# Patient Record
Sex: MALE | Race: ASIAN
[De-identification: names, ages, dates, MRNs, and addresses within clinical notes are randomized per-mention and may not be internally consistent; named-entity substitution may affect disease eponyms.]

---

## 2017-10-10 ENCOUNTER — APPOINTMENT (OUTPATIENT)
Dept: UROLOGY | Facility: CLINIC | Age: 52
End: 2017-10-10
Payer: MEDICAID

## 2017-10-10 VITALS
SYSTOLIC BLOOD PRESSURE: 138 MMHG | RESPIRATION RATE: 18 BRPM | WEIGHT: 152 LBS | DIASTOLIC BLOOD PRESSURE: 87 MMHG | TEMPERATURE: 97.6 F | BODY MASS INDEX: 22.01 KG/M2 | HEIGHT: 69.5 IN | HEART RATE: 81 BPM | OXYGEN SATURATION: 100 %

## 2017-10-10 DIAGNOSIS — Z80.0 FAMILY HISTORY OF MALIGNANT NEOPLASM OF DIGESTIVE ORGANS: ICD-10-CM

## 2017-10-10 LAB
ANION GAP SERPL CALC-SCNC: 14 MMOL/L
APPEARANCE: CLEAR
BACTERIA: NEGATIVE
BILIRUBIN URINE: NEGATIVE
BLOOD URINE: NEGATIVE
BUN SERPL-MCNC: 12 MG/DL
CALCIUM OXALATE CRYSTALS: ABNORMAL
CALCIUM SERPL-MCNC: 9.7 MG/DL
CHLORIDE SERPL-SCNC: 103 MMOL/L
CO2 SERPL-SCNC: 24 MMOL/L
COLOR: YELLOW
CREAT SERPL-MCNC: 0.82 MG/DL
GLUCOSE QUALITATIVE U: NEGATIVE MG/DL
GLUCOSE SERPL-MCNC: 90 MG/DL
KETONES URINE: NEGATIVE
LEUKOCYTE ESTERASE URINE: NEGATIVE
MICROSCOPIC-UA: NORMAL
NITRITE URINE: NEGATIVE
PH URINE: 5
POTASSIUM SERPL-SCNC: 4.3 MMOL/L
PROTEIN URINE: NEGATIVE MG/DL
PSA FREE FLD-MCNC: 23.6
PSA FREE SERPL-MCNC: 0.17 NG/ML
PSA SERPL-MCNC: 0.72 NG/ML
RED BLOOD CELLS URINE: 1 /HPF
SODIUM SERPL-SCNC: 141 MMOL/L
SPECIFIC GRAVITY URINE: 1.02
SQUAMOUS EPITHELIAL CELLS: 0 /HPF
UROBILINOGEN URINE: NEGATIVE MG/DL
WHITE BLOOD CELLS URINE: 1 /HPF

## 2017-10-10 PROCEDURE — 99204 OFFICE O/P NEW MOD 45 MIN: CPT

## 2017-10-10 RX ORDER — MELOXICAM 7.5 MG/1
7.5 TABLET ORAL
Qty: 30 | Refills: 0 | Status: ACTIVE | COMMUNITY
Start: 2017-07-22

## 2017-10-10 RX ORDER — LEVOFLOXACIN 500 MG/1
500 TABLET, FILM COATED ORAL
Qty: 14 | Refills: 0 | Status: ACTIVE | COMMUNITY
Start: 2017-08-26

## 2017-10-10 RX ORDER — AMOXICILLIN AND CLAVULANATE POTASSIUM 875; 125 MG/1; MG/1
875-125 TABLET, COATED ORAL
Qty: 28 | Refills: 0 | Status: ACTIVE | COMMUNITY
Start: 2017-09-16

## 2017-10-10 RX ORDER — LOSARTAN POTASSIUM 50 MG/1
50 TABLET, FILM COATED ORAL
Qty: 30 | Refills: 0 | Status: ACTIVE | COMMUNITY
Start: 2017-09-02

## 2017-10-10 RX ORDER — POLYETHYLENE GLYCOL-3350, SODIUM CHLORIDE, POTASSIUM CHLORIDE AND SODIUM BICARBONATE 420; 11.2; 5.72; 1.48 G/438.4G; G/438.4G; G/438.4G; G/438.4G
420 POWDER, FOR SOLUTION ORAL
Qty: 4000 | Refills: 0 | Status: ACTIVE | COMMUNITY
Start: 2017-09-16

## 2017-10-10 RX ORDER — AMLODIPINE BESYLATE 5 MG/1
5 TABLET ORAL
Qty: 30 | Refills: 0 | Status: ACTIVE | COMMUNITY
Start: 2017-01-13

## 2017-10-10 RX ORDER — BISACODYL 5 MG/1
5 TABLET ORAL
Qty: 2 | Refills: 0 | Status: ACTIVE | COMMUNITY
Start: 2017-09-16

## 2017-10-10 RX ORDER — TAMSULOSIN HYDROCHLORIDE 0.4 MG/1
0.4 CAPSULE ORAL
Qty: 30 | Refills: 0 | Status: ACTIVE | COMMUNITY
Start: 2017-08-26

## 2017-10-10 RX ORDER — TEMAZEPAM 15 MG/1
15 CAPSULE ORAL
Qty: 30 | Refills: 0 | Status: ACTIVE | COMMUNITY
Start: 2017-09-02

## 2017-10-10 RX ORDER — DOXYCYCLINE HYCLATE 100 MG/1
100 CAPSULE ORAL
Qty: 28 | Refills: 0 | Status: ACTIVE | COMMUNITY
Start: 2017-07-22

## 2017-11-07 ENCOUNTER — APPOINTMENT (OUTPATIENT)
Dept: UROLOGY | Facility: CLINIC | Age: 52
End: 2017-11-07

## 2017-11-14 ENCOUNTER — APPOINTMENT (OUTPATIENT)
Dept: UROLOGY | Facility: CLINIC | Age: 52
End: 2017-11-14
Payer: MEDICAID

## 2017-11-14 VITALS
SYSTOLIC BLOOD PRESSURE: 152 MMHG | RESPIRATION RATE: 17 BRPM | OXYGEN SATURATION: 100 % | DIASTOLIC BLOOD PRESSURE: 88 MMHG | TEMPERATURE: 97.7 F | WEIGHT: 156 LBS | BODY MASS INDEX: 22.71 KG/M2 | HEART RATE: 74 BPM

## 2017-11-14 PROCEDURE — 51798 US URINE CAPACITY MEASURE: CPT

## 2017-11-14 PROCEDURE — 99214 OFFICE O/P EST MOD 30 MIN: CPT

## 2018-05-15 ENCOUNTER — APPOINTMENT (OUTPATIENT)
Dept: UROLOGY | Facility: CLINIC | Age: 53
End: 2018-05-15
Payer: MEDICAID

## 2018-05-15 VITALS
BODY MASS INDEX: 23.29 KG/M2 | RESPIRATION RATE: 18 BRPM | TEMPERATURE: 98 F | DIASTOLIC BLOOD PRESSURE: 86 MMHG | WEIGHT: 160 LBS | SYSTOLIC BLOOD PRESSURE: 133 MMHG | OXYGEN SATURATION: 99 % | HEART RATE: 72 BPM

## 2018-05-15 PROCEDURE — 99214 OFFICE O/P EST MOD 30 MIN: CPT

## 2018-05-29 ENCOUNTER — APPOINTMENT (OUTPATIENT)
Dept: UROLOGY | Facility: CLINIC | Age: 53
End: 2018-05-29
Payer: MEDICAID

## 2018-05-29 VITALS
SYSTOLIC BLOOD PRESSURE: 135 MMHG | OXYGEN SATURATION: 98 % | DIASTOLIC BLOOD PRESSURE: 84 MMHG | RESPIRATION RATE: 18 BRPM | TEMPERATURE: 97.4 F | HEART RATE: 64 BPM

## 2018-05-29 PROCEDURE — 99213 OFFICE O/P EST LOW 20 MIN: CPT | Mod: 25

## 2018-05-29 PROCEDURE — 52000 CYSTOURETHROSCOPY: CPT

## 2018-05-29 RX ORDER — CIPROFLOXACIN HYDROCHLORIDE 500 MG/1
500 TABLET, FILM COATED ORAL
Refills: 0 | Status: COMPLETED | OUTPATIENT
Start: 2018-05-29

## 2018-05-29 RX ORDER — CIPROFLOXACIN HYDROCHLORIDE 500 MG/1
500 TABLET, FILM COATED ORAL
Qty: 2 | Refills: 0 | Status: ACTIVE | COMMUNITY
Start: 2018-05-29

## 2018-05-29 RX ADMIN — CIPROFLOXACIN HYDROCHLORIDE 0 MG: 500 TABLET, FILM COATED ORAL at 00:00

## 2018-05-30 LAB — CORE LAB FLUID CYTOLOGY: NORMAL

## 2018-11-27 ENCOUNTER — APPOINTMENT (OUTPATIENT)
Dept: UROLOGY | Facility: CLINIC | Age: 53
End: 2018-11-27
Payer: MEDICAID

## 2018-11-27 VITALS
OXYGEN SATURATION: 96 % | WEIGHT: 165 LBS | BODY MASS INDEX: 24.02 KG/M2 | HEART RATE: 86 BPM | DIASTOLIC BLOOD PRESSURE: 83 MMHG | TEMPERATURE: 97.6 F | SYSTOLIC BLOOD PRESSURE: 142 MMHG | RESPIRATION RATE: 18 BRPM

## 2018-11-27 PROCEDURE — 99214 OFFICE O/P EST MOD 30 MIN: CPT

## 2018-11-28 LAB
ANION GAP SERPL CALC-SCNC: 11 MMOL/L
BUN SERPL-MCNC: 14 MG/DL
CALCIUM SERPL-MCNC: 9.4 MG/DL
CHLORIDE SERPL-SCNC: 106 MMOL/L
CO2 SERPL-SCNC: 23 MMOL/L
CREAT SERPL-MCNC: 0.75 MG/DL
GLUCOSE SERPL-MCNC: 113 MG/DL
POTASSIUM SERPL-SCNC: 3.8 MMOL/L
PSA FREE FLD-MCNC: 18.2
PSA FREE SERPL-MCNC: 0.14 NG/ML
PSA SERPL-MCNC: 0.77 NG/ML
SODIUM SERPL-SCNC: 140 MMOL/L

## 2019-04-05 ENCOUNTER — RECORD ABSTRACTING (OUTPATIENT)
Age: 54
End: 2019-04-05

## 2019-06-04 ENCOUNTER — APPOINTMENT (OUTPATIENT)
Dept: UROLOGY | Facility: CLINIC | Age: 54
End: 2019-06-04
Payer: MEDICAID

## 2019-06-04 VITALS
OXYGEN SATURATION: 98 % | HEART RATE: 86 BPM | TEMPERATURE: 98.4 F | RESPIRATION RATE: 14 BRPM | SYSTOLIC BLOOD PRESSURE: 142 MMHG | DIASTOLIC BLOOD PRESSURE: 78 MMHG

## 2019-06-04 PROCEDURE — 99214 OFFICE O/P EST MOD 30 MIN: CPT

## 2020-07-07 ENCOUNTER — LABORATORY RESULT (OUTPATIENT)
Age: 55
End: 2020-07-07

## 2020-07-07 ENCOUNTER — APPOINTMENT (OUTPATIENT)
Dept: UROLOGY | Facility: CLINIC | Age: 55
End: 2020-07-07
Payer: MEDICAID

## 2020-07-07 VITALS
SYSTOLIC BLOOD PRESSURE: 128 MMHG | RESPIRATION RATE: 17 BRPM | BODY MASS INDEX: 23.14 KG/M2 | TEMPERATURE: 98 F | WEIGHT: 159 LBS | HEART RATE: 90 BPM | DIASTOLIC BLOOD PRESSURE: 86 MMHG | OXYGEN SATURATION: 99 %

## 2020-07-07 DIAGNOSIS — R39.12 POOR URINARY STREAM: ICD-10-CM

## 2020-07-07 DIAGNOSIS — R35.1 NOCTURIA: ICD-10-CM

## 2020-07-07 DIAGNOSIS — Z00.00 ENCOUNTER FOR GENERAL ADULT MEDICAL EXAMINATION W/OUT ABNORMAL FINDINGS: ICD-10-CM

## 2020-07-07 LAB
ANION GAP SERPL CALC-SCNC: 11 MMOL/L
BUN SERPL-MCNC: 14 MG/DL
CALCIUM SERPL-MCNC: 9.5 MG/DL
CHLORIDE SERPL-SCNC: 102 MMOL/L
CO2 SERPL-SCNC: 27 MMOL/L
CREAT SERPL-MCNC: 0.78 MG/DL
GLUCOSE SERPL-MCNC: 87 MG/DL
POTASSIUM SERPL-SCNC: 4.5 MMOL/L
SODIUM SERPL-SCNC: 140 MMOL/L

## 2020-07-07 PROCEDURE — 99214 OFFICE O/P EST MOD 30 MIN: CPT

## 2020-07-07 RX ORDER — FINASTERIDE 5 MG/1
5 TABLET, FILM COATED ORAL DAILY
Qty: 90 | Refills: 3 | Status: ACTIVE | COMMUNITY
Start: 2017-10-10 | End: 1900-01-01

## 2020-07-07 RX ORDER — OXYBUTYNIN CHLORIDE 5 MG/1
5 TABLET, EXTENDED RELEASE ORAL DAILY
Qty: 90 | Refills: 3 | Status: ACTIVE | COMMUNITY
Start: 2018-05-15 | End: 1900-01-01

## 2020-07-07 RX ORDER — TAMSULOSIN HYDROCHLORIDE 0.4 MG/1
0.4 CAPSULE ORAL
Qty: 180 | Refills: 3 | Status: ACTIVE | COMMUNITY
Start: 2017-10-10 | End: 1900-01-01

## 2020-07-07 NOTE — ADDENDUM
[FreeTextEntry1] : Entered by Asad Holland, acting as scribe for Dr. Nain Barrios.\par \par The documentation recorded by the scribe accurately reflects the service I personally performed and the decisions made by me.

## 2020-07-07 NOTE — LETTER BODY
[FreeTextEntry1] : Meseret Hurst MD\par [facility] [facility] [address]\[place] [address]\par [phone]\par [phone]\par \par Dear Dr. Hurst,\par \par REASON FOR VISIT: BPH\par \par This is a 54 year-old  with lower urinary tract symptoms and BPH. The patient returns today for follow up. Since his last visit, patient is overall well and reports stable urinary symptoms. He continues to take Flomax BID, Proscar, and Oxybutynin 5 mg without side effects or difficulties. He denies any hematuria or urinary incontinence. He denies any pain. The past medical history, family history and social history are unchanged. All other review of systems are negative. Patient denies any changes in medications. Medication list was reconciled.\par  \par On examination, the patient is a healthy-appearing gentleman in no acute distress. He is alert and oriented follows commands. He has normal mood and affect. He is normocephalic. Oral no thrush. Neck is supple. Respirations are unlabored. His abdomen is soft and nontender. Liver is nonpalpable. Bladder is nonpalpable. No CVA tenderness. Neurologically he is grossly intact. No peripheral edema. Skin without gross abnormality. He has normal male external genitalia. Normal meatus. Bilateral testes are descended intrascrotally and normal to palpation. On rectal examination, there is normal sphincter tone. The prostate is clinically benign without focal induration or nodularity. \par \par ASSESSMENT: BPH, stable with Flomax BID, Proscar, and oxybutynin 5 mg.\par \par I counseled the patient. He is overall well. In terms of his BPH, I renewed the patient's prescription for Flomax, Proscar, and Oxybutynin today. I encouraged the patient to continue medications regularly as directed. I recommend he repeat BMP and PSA today to ensure stability. Risks and alternatives were discussed. I answered the patient questions. Patient understands that if he develops gross hematuria or any urinary discomfort, he will contact me for further evaluation. The patient will follow-up as directed and will contact me with any questions or concerns. Thank you for the opportunity to participate in the care of Mr. RIZO. I will keep you updated on his progress. \par \par Plan: Continue Flomax BID, Proscar and oxybutynin 5 mg. BMP. PSA. Followup 1 year.

## 2020-07-15 DIAGNOSIS — N40.1 BENIGN PROSTATIC HYPERPLASIA WITH LOWER URINARY TRACT SYMPMS: ICD-10-CM

## 2020-07-28 LAB
PSA FREE FLD-MCNC: 16 %
PSA FREE SERPL-MCNC: 0.12 NG/ML
PSA SERPL-MCNC: 0.74 NG/ML

## 2021-07-06 ENCOUNTER — APPOINTMENT (OUTPATIENT)
Dept: UROLOGY | Facility: CLINIC | Age: 56
End: 2021-07-06

## 2024-08-19 NOTE — LETTER BODY
----- Message from More Chew sent at 8/19/2024  2:11 PM CDT -----  Contact: [phone]  Patient called, requested a courtesy call from Dr Trinidad's office in regards following up on the ultrasound that patient is suppose to have, patient stated that no one has reach out to her, and not orders on file. Thank you.   [FreeTextEntry1] : Meseret Hurst MD\par [address] [facility]\par [address]\[place] [address]\par [phone]\par [phone]\par \par Dear Dr. Hurst,\par \par \par REASON FOR VISIT: BPH\par \par This is a 53 year-old  with lower urinary tract symptoms and BPH. The patient returns today for follow up. Since his last visit, patient reports taking Flomax BID, Proscar and oxybutynin 5 mg regularly with no side effects or difficulties. He reports decreased frequency and improved uroflow with medication. He denies any hematuria or urinary incontinence. He denies any pain. The past medical history, family history and social history are unchanged. All other review of systems are negative. Patient denies any changes in medications. Medication list was reconciled.\par  \par On examination, the patient is a healthy-appearing gentleman in no acute distress. He is alert and oriented follows commands. He has normal mood and affect. He is normocephalic. Oral no thrush. Neck is supple. Respirations are unlabored. His abdomen is soft and nontender. Liver is nonpalpable. Bladder is nonpalpable. No CVA tenderness. Neurologically he is grossly intact. No peripheral edema. Skin without gross abnormality. He has normal male external genitalia. Normal meatus. Bilateral testes are descended intrascrotally and normal to palpation. On rectal examination, there is normal sphincter tone. The prostate is clinically benign without focal induration or nodularity. \par \par His recent PSA was 0.77.\par \par ASSESSMENT: BPH, improved with Flomax BID, Proscar, and oxybutynin 5 mg.\par \par I counseled the patient. In terms of his BPH, he reports improvement with Flomax BID, Proscar and oxybutynin 5 mg. I renewed the patient's prescription for Flomax, Proscar and oxybutynin today. I encouraged the patient to continue medications regularly as directed. I recommend the patient consider Greenlight laser vaporization of prostate if his symptoms progress. The patient will obtain blood test and PSA today. Risks and alternatives were discussed. I answered the patient questions. The patient will follow-up as directed and will contact me with any questions or concerns. Thank you for the opportunity to participate in the care of Mr. RIZO. I will keep you updated on his progress. \par \par Plan: Continue tamsulosin BID, Proscar and oxybutynin 5 mg. Followup 1 year.